# Patient Record
Sex: MALE | Race: WHITE | Employment: FULL TIME | URBAN - NONMETROPOLITAN AREA
[De-identification: names, ages, dates, MRNs, and addresses within clinical notes are randomized per-mention and may not be internally consistent; named-entity substitution may affect disease eponyms.]

---

## 2017-01-30 ENCOUNTER — OFFICE VISIT (OUTPATIENT)
Dept: UROLOGY | Age: 52
End: 2017-01-30

## 2017-01-30 VITALS
DIASTOLIC BLOOD PRESSURE: 68 MMHG | HEIGHT: 73 IN | BODY MASS INDEX: 27.17 KG/M2 | SYSTOLIC BLOOD PRESSURE: 108 MMHG | WEIGHT: 205 LBS

## 2017-01-30 DIAGNOSIS — N41.9 PROSTATITIS, UNSPECIFIED PROSTATITIS TYPE: Primary | ICD-10-CM

## 2017-01-30 LAB
BILIRUBIN URINE: NEGATIVE
BLOOD URINE, POC: NORMAL
CHARACTER, URINE: CLEAR
COLOR, URINE: YELLOW
GLUCOSE URINE: NEGATIVE MG/DL
KETONES, URINE: NORMAL
LEUKOCYTE CLUMPS, URINE: NEGATIVE
NITRITE, URINE: NEGATIVE
PH, URINE: 5.5
PROTEIN, URINE: NEGATIVE MG/DL
SPECIFIC GRAVITY, URINE: 1.02 (ref 1–1.03)
UROBILINOGEN, URINE: 0.2 EU/DL

## 2017-01-30 PROCEDURE — 81003 URINALYSIS AUTO W/O SCOPE: CPT | Performed by: UROLOGY

## 2017-01-30 PROCEDURE — 99213 OFFICE O/P EST LOW 20 MIN: CPT | Performed by: UROLOGY

## 2017-01-30 ASSESSMENT — ENCOUNTER SYMPTOMS
ABDOMINAL PAIN: 0
BACK PAIN: 1
NAUSEA: 0
EYE PAIN: 0
EYE REDNESS: 0
COLOR CHANGE: 0
FACIAL SWELLING: 0
SHORTNESS OF BREATH: 0
CHEST TIGHTNESS: 0

## 2017-09-06 RX ORDER — CIPROFLOXACIN 500 MG/1
500 TABLET, FILM COATED ORAL 2 TIMES DAILY
Qty: 20 TABLET | Refills: 0 | Status: SHIPPED | OUTPATIENT
Start: 2017-09-06 | End: 2017-09-16

## 2017-09-06 RX ORDER — TAMSULOSIN HYDROCHLORIDE 0.4 MG/1
0.4 CAPSULE ORAL NIGHTLY
Qty: 90 CAPSULE | Refills: 3 | Status: SHIPPED | OUTPATIENT
Start: 2017-09-06 | End: 2018-10-19 | Stop reason: SDUPTHER

## 2017-11-02 ENCOUNTER — OFFICE VISIT (OUTPATIENT)
Dept: UROLOGY | Age: 52
End: 2017-11-02
Payer: COMMERCIAL

## 2017-11-02 VITALS
WEIGHT: 212 LBS | HEIGHT: 72 IN | DIASTOLIC BLOOD PRESSURE: 90 MMHG | SYSTOLIC BLOOD PRESSURE: 120 MMHG | BODY MASS INDEX: 28.71 KG/M2

## 2017-11-02 DIAGNOSIS — N41.9 PROSTATITIS, UNSPECIFIED PROSTATITIS TYPE: Primary | ICD-10-CM

## 2017-11-02 DIAGNOSIS — N50.812 TESTICULAR PAIN, LEFT: ICD-10-CM

## 2017-11-02 LAB
BILIRUBIN URINE: NEGATIVE
BLOOD URINE, POC: NORMAL
CHARACTER, URINE: CLEAR
COLOR, URINE: YELLOW
GLUCOSE URINE: NEGATIVE MG/DL
KETONES, URINE: NEGATIVE
LEUKOCYTE CLUMPS, URINE: NEGATIVE
NITRITE, URINE: NEGATIVE
PH, URINE: 5.5
POST VOID RESIDUAL (PVR): NORMAL ML
PROTEIN, URINE: NEGATIVE MG/DL
SPECIFIC GRAVITY, URINE: 1.02 (ref 1–1.03)
UROBILINOGEN, URINE: 0.2 EU/DL

## 2017-11-02 PROCEDURE — 51798 US URINE CAPACITY MEASURE: CPT | Performed by: UROLOGY

## 2017-11-02 PROCEDURE — 99214 OFFICE O/P EST MOD 30 MIN: CPT | Performed by: UROLOGY

## 2017-11-02 PROCEDURE — 81003 URINALYSIS AUTO W/O SCOPE: CPT | Performed by: UROLOGY

## 2017-11-02 RX ORDER — CIPROFLOXACIN 500 MG/1
500 TABLET, FILM COATED ORAL 2 TIMES DAILY
Qty: 28 TABLET | Refills: 0 | Status: SHIPPED | OUTPATIENT
Start: 2017-11-02 | End: 2017-11-09

## 2017-11-02 ASSESSMENT — ENCOUNTER SYMPTOMS
VOMITING: 0
COLOR CHANGE: 0
CHEST TIGHTNESS: 0
EYE REDNESS: 0
ABDOMINAL PAIN: 0
EYE PAIN: 0
FACIAL SWELLING: 0
SHORTNESS OF BREATH: 0

## 2017-11-02 NOTE — PROGRESS NOTES
Subjective:      Patient ID: Erasmo Cortez 46 y.o. male 1965    Chief Complaint   Patient presents with    Follow-up     Prostatitis        Testicle Pain   This is a recurrent (left testicular pain) problem. The current episode started 1 to 4 weeks ago. The problem occurs intermittently. The problem has been waxing and waning. Pertinent negatives include no abdominal pain, chest pain, chills, fever, neck pain, rash or vomiting. Nothing aggravates the symptoms. He has tried nothing for the symptoms. History reviewed. No pertinent past medical history. Social History     Social History    Marital status:      Spouse name: N/A    Number of children: N/A    Years of education: N/A     Occupational History    Not on file. Social History Main Topics    Smoking status: Never Smoker    Smokeless tobacco: Not on file    Alcohol use Not on file    Drug use: Unknown    Sexual activity: Not on file     Other Topics Concern    Not on file     Social History Narrative    No narrative on file       No family history on file. Past Surgical History:   Procedure Laterality Date    COLONOSCOPY  2016    HAND SURGERY  1987    thumb surgery    PENIS SURGERY  2002    cyst-ejaculation duct       No Known Allergies      Current Outpatient Prescriptions:     tamsulosin (FLOMAX) 0.4 MG capsule, Take 1 capsule by mouth nightly, Disp: 90 capsule, Rfl: 3    Review of Systems   Constitutional: Negative for chills and fever. HENT: Negative for ear pain and facial swelling. Eyes: Negative for pain and redness. Respiratory: Negative for chest tightness and shortness of breath. Cardiovascular: Negative for chest pain and leg swelling. Gastrointestinal: Negative for abdominal pain and vomiting. Endocrine: Negative for cold intolerance and heat intolerance. Genitourinary: Positive for difficulty urinating, dysuria, frequency, testicular pain and urgency.    Musculoskeletal: Negative for neck pain and neck stiffness. Skin: Negative for color change and rash. Allergic/Immunologic: Negative for environmental allergies and food allergies. Neurological: Negative for dizziness and light-headedness. Hematological: Does not bruise/bleed easily. Ht 6' (1.829 m)   Wt 212 lb (96.2 kg)   BMI 28.75 kg/m²     Objective:   Physical Exam   Constitutional: He is oriented to person, place, and time. Vital signs are normal. He appears well-developed and well-nourished. He is cooperative. No distress. HENT:   Head: Normocephalic and atraumatic. Mouth/Throat: Oropharynx is clear and moist and mucous membranes are normal. No oropharyngeal exudate. Eyes: EOM are normal. Pupils are equal, round, and reactive to light. Right eye exhibits no discharge. Left eye exhibits no discharge. No scleral icterus. Neck: Trachea normal. No JVD present. No tracheal deviation present. Pulmonary/Chest: Effort normal. No respiratory distress. He has no wheezes. Abdominal: Soft. He exhibits no distension. There is no tenderness. There is no rebound and no CVA tenderness. Musculoskeletal: He exhibits no edema or tenderness. Lymphadenopathy:        Right: No supraclavicular adenopathy present. Left: No supraclavicular adenopathy present. Neurological: He is alert and oriented to person, place, and time. No cranial nerve deficit. Skin: Skin is warm and dry. He is not diaphoretic. Psychiatric: He has a normal mood and affect. His behavior is normal.   Nursing note and vitals reviewed.     Labs    Results for POC orders placed in visit on 11/02/17   POCT Urinalysis No Micro (Auto)   Result Value Ref Range    Glucose, Ur Negative NEGATIVE mg/dl    Bilirubin Urine Negative     Ketones, Urine Negative NEGATIVE    Specific Gravity, Urine 1.025 1.002 - 1.03    Blood, UA POC Trace-lysed NEGATIVE    pH, Urine 5.50 5.0 - 9.0    Protein, Urine Negative NEGATIVE mg/dl    Urobilinogen, Urine 0.20 0.0 - 1.0 eu/dl Nitrite, Urine Negative NEGATIVE    Leukocyte Clumps, Urine Negative NEGATIVE    Color, Urine Yellow YELLOW-STR    Character, Urine Clear CLR-SL.CESIA   poct post void residual   Result Value Ref Range    post void residual Bladder Scan ml       No results found for: CREATININE, BUN, NA, K, CL, CO2    No results found for: PSA      Assessment:      1. Prostatitis, unspecified prostatitis type  POCT Urinalysis No Micro (Auto)    poct post void residual       Mr. Sg Becerra presents today in follow-up for Prostatitis, unspecified prostatitis type [N41.9]. He is still having pain in the left testicle. He says he has a hydrocele there which he feels has been more inflamed recently causing pain. He was put on Cipro in the past and this improved symptoms. We will put him on another two week course of Cipro. Examination: tenderness on the left testicle. Possible epididymal cyst.    He describes the pain as a nagging feeling. He states in the past he had a cyst removed off the ejaculatory duct on the left side. We will get a scrotal ultrasound in the near future. I have reviewed all notes sent along with this referral including notes from a recent visit to his primary care provider. These records demonstrated the following past medical history:  History reviewed. No pertinent past medical history. Plan:        Schedule a scrotal ultrasound. Return in 2 weeks to go over results.    Cipro started

## 2017-11-13 ENCOUNTER — HOSPITAL ENCOUNTER (OUTPATIENT)
Dept: ULTRASOUND IMAGING | Age: 52
Discharge: HOME | End: 2017-11-13
Payer: COMMERCIAL

## 2017-11-13 DIAGNOSIS — N50.812 TESTICULAR PAIN, LEFT: ICD-10-CM

## 2017-11-13 PROCEDURE — 76870 US EXAM SCROTUM: CPT

## 2017-11-21 ENCOUNTER — OFFICE VISIT (OUTPATIENT)
Dept: UROLOGY | Age: 52
End: 2017-11-21
Payer: COMMERCIAL

## 2017-11-21 VITALS
DIASTOLIC BLOOD PRESSURE: 63 MMHG | SYSTOLIC BLOOD PRESSURE: 134 MMHG | BODY MASS INDEX: 27.36 KG/M2 | HEIGHT: 72 IN | WEIGHT: 202 LBS

## 2017-11-21 DIAGNOSIS — N41.9 PROSTATITIS, UNSPECIFIED PROSTATITIS TYPE: Primary | ICD-10-CM

## 2017-11-21 LAB
BILIRUBIN URINE: NORMAL
BLOOD URINE, POC: NORMAL
CHARACTER, URINE: CLEAR
COLOR, URINE: YELLOW
GLUCOSE URINE: NEGATIVE MG/DL
KETONES, URINE: NORMAL
LEUKOCYTE CLUMPS, URINE: NEGATIVE
NITRITE, URINE: NEGATIVE
PH, URINE: 5
PROTEIN, URINE: NEGATIVE MG/DL
SPECIFIC GRAVITY, URINE: >= 1.03 (ref 1–1.03)
UROBILINOGEN, URINE: 0.2 EU/DL

## 2017-11-21 PROCEDURE — 81003 URINALYSIS AUTO W/O SCOPE: CPT | Performed by: NURSE PRACTITIONER

## 2017-11-21 PROCEDURE — 99213 OFFICE O/P EST LOW 20 MIN: CPT | Performed by: NURSE PRACTITIONER

## 2017-11-21 RX ORDER — KETOROLAC TROMETHAMINE 10 MG/1
10 TABLET, FILM COATED ORAL EVERY 6 HOURS PRN
Qty: 30 TABLET | Refills: 1 | Status: SHIPPED | OUTPATIENT
Start: 2017-11-21 | End: 2018-08-03

## 2017-11-21 NOTE — PROGRESS NOTES
[facility]  [facility]  [address]  [address]  [place] [address]  Dept: [phone]  Loc: [phone]  Visit Date: 11/21/2017      HPI:     Jazlyn Erickson follows up today with scrotal US. He was last evaluated by Dr. Jackie Fenton on 11/2/17. He is complaining of left testicular pain, and possible prostatitis. He was placed on another 2 week course of Cipro, symptoms are still present. No real improvement. Scrotal ultrasound was performed and showed tiny hydrocele left side, small epididymal head cysts bilaterally. Tiny 2 mm cyst in the left testicle. He does report some issues with emptying his bladder all the way. He has never taken any medications for this. He comes in today by himself. History is obtained from the patient and the medical record. Current Outpatient Prescriptions   Medication Sig Dispense Refill    ketorolac (TORADOL) 10 MG tablet Take 1 tablet by mouth every 6 hours as needed for Pain 30 tablet 1    tamsulosin (FLOMAX) 0.4 MG capsule Take 1 capsule by mouth nightly 90 capsule 3     No current facility-administered medications for this visit. Past Medical History  Rl Goetz  has no past medical history on file. Past Surgical History  The patient  has a past surgical history that includes Hand surgery (1987); Penis surgery (2002); and Colonoscopy (2016). Family History  This patient's family history is not on file. Social History  Rl Goetz  reports that he has never smoked. He has never used smokeless tobacco.    Subjective:     Review of Systems  No problems with ears, nose or throat. No problems with eyes. No chest pain, shortness of breath, abdominal pain, extremity pain or weakness, and no neurological deficits. No rashes.  symptoms per HPI. The remainder of the review of symptoms is negative.     Objective:     PE:   Vitals:    11/21/17 1531   BP: 134/63   Weight: 202 lb (91.6 kg)   Height: 6' (1.829 m)       Constitutional: Alert and oriented times 3, no acute distress and cooperative to examination with appropriate mood and affect. HENT:   Head:        Normocephalic and atraumatic. Mouth/Throat:         Mucous membranes are normal.   Eyes:         EOM are normal. No scleral icterus. PERRLA. Neck:        Supple, symmetrical, trachea midline  Pulmonary/Chest:      Chest symmetric with normal A/P diameter. Normal respiratory rate and rhthym. No use of accessory muscles. Abdominal:         Soft. No tenderness. Bowel sounds present. Musculoskeletal:         Normal range of motion. No edema or tenderness of lower extremities. Extremities: No cyanosis, clubbing, or edema present. Neurological:        Alert and oriented. No cranial nerve deficit. Gevena Tammy Psychiatric:        Normal mood and affect. Labs   Urine dip demonstrates   Results for POC orders placed in visit on 11/21/17   POCT Urinalysis No Micro (Auto)   Result Value Ref Range    Glucose, Ur Negative NEGATIVE mg/dl    Bilirubin Urine Small     Ketones, Urine Trace NEGATIVE    Specific Gravity, Urine >= 1.030 1.002 - 1.03    Blood, UA POC Small NEGATIVE    pH, Urine 5.00 5.0 - 9.0    Protein, Urine Negative NEGATIVE mg/dl    Urobilinogen, Urine 0.20 0.0 - 1.0 eu/dl    Nitrite, Urine Negative NEGATIVE    Leukocyte Clumps, Urine Negative NEGATIVE    Color, Urine Yellow YELLOW-STR    Character, Urine Clear CLR-SL.CESIA       Patients recent PSA values are as follows  No results found for: PSA, PSADIA     Recent BUN/Creatinine:  No results found for: BUN, CREATININE    Radiology  The patient has had a Scrotal Ultrasound which I have reviewed along with its accompanying report. The study demonstrates   FINDINGS:        Right Testicle- 5.8 x 3.2 x 2.8 cm   Right Epididymis- 0.9 x 1.3 x 0.9 cm   Left Testicle - 5.6 x 3.3 x 3.2 cm    Left Epididymis - 0.8 x 1.7 x 1 cm       Both testicles are normal in size, contour, and echogenicity.  Normal vascular color flow demonstrated to both

## 2018-01-09 RX ORDER — TAMSULOSIN HYDROCHLORIDE 0.4 MG/1
0.4 CAPSULE ORAL NIGHTLY
Qty: 90 CAPSULE | Refills: 3 | OUTPATIENT
Start: 2018-01-09 | End: 2019-01-09

## 2018-02-09 NOTE — TELEPHONE ENCOUNTER
Valiant Goodell called requesting a refill on the following medications:  Requested Prescriptions     Pending Prescriptions Disp Refills    ciprofloxacin (CIPRO) 500 MG tablet 28 tablet 0     Sig: Take 1 tablet by mouth 2 times daily for 14 doses     Pharmacy verified:  [facility]      Date of last visit: 11/21/17  Date of next visit (if applicable): Visit date not found

## 2018-02-12 ENCOUNTER — TELEPHONE (OUTPATIENT)
Dept: UROLOGY | Age: 53
End: 2018-02-12

## 2018-02-12 RX ORDER — CIPROFLOXACIN 500 MG/1
500 TABLET, FILM COATED ORAL 2 TIMES DAILY
Qty: 20 TABLET | Refills: 0 | Status: SHIPPED | OUTPATIENT
Start: 2018-02-12 | End: 2018-02-22

## 2018-02-12 RX ORDER — CIPROFLOXACIN 500 MG/1
500 TABLET, FILM COATED ORAL 2 TIMES DAILY
Qty: 28 TABLET | Refills: 0 | OUTPATIENT
Start: 2018-02-12 | End: 2018-02-19

## 2018-02-12 NOTE — TELEPHONE ENCOUNTER
Is he experiencing any urinary symptoms, frequency/urgency/dysuria/incomplete bladder emptying??    If he is, he may benefit from obtaining a urine sample prior to starting antibiotics to evaluate for infection, regardless, he needs to be seen in follow up as I see he has no appt scheduled.  Thanks    Cipro sent to pharmacy     [employer]

## 2018-02-12 NOTE — TELEPHONE ENCOUNTER
Patient is have pain when urinating, his prostatitis is acting up, he feels he might have an infection, that is why he is asking for Cipro.  Please advise at [phone]

## 2018-02-12 NOTE — TELEPHONE ENCOUNTER
Left message, per HIPAA, asking for a phone call back to our office to further evaluate patient symptoms.

## 2018-02-12 NOTE — TELEPHONE ENCOUNTER
LM for pt to call the office to ask if he is currently having symptoms, we need to find out why he needs the refill on the cipro.

## 2018-08-03 ENCOUNTER — OFFICE VISIT (OUTPATIENT)
Dept: UROLOGY | Age: 53
End: 2018-08-03
Payer: COMMERCIAL

## 2018-08-03 VITALS
HEIGHT: 72 IN | BODY MASS INDEX: 28.44 KG/M2 | SYSTOLIC BLOOD PRESSURE: 102 MMHG | DIASTOLIC BLOOD PRESSURE: 70 MMHG | WEIGHT: 210 LBS

## 2018-08-03 DIAGNOSIS — G89.29 CHRONIC MALE PELVIC PAIN: Primary | ICD-10-CM

## 2018-08-03 DIAGNOSIS — R39.12 WEAK URINARY STREAM: ICD-10-CM

## 2018-08-03 DIAGNOSIS — R10.2 CHRONIC MALE PELVIC PAIN: Primary | ICD-10-CM

## 2018-08-03 DIAGNOSIS — R39.15 URINARY URGENCY: ICD-10-CM

## 2018-08-03 DIAGNOSIS — R31.29 MICROHEMATURIA: ICD-10-CM

## 2018-08-03 LAB
BILIRUBIN URINE: NEGATIVE
BLOOD URINE, POC: NORMAL
CHARACTER, URINE: CLEAR
COLOR, URINE: YELLOW
GLUCOSE URINE: NEGATIVE MG/DL
KETONES, URINE: NEGATIVE
LEUKOCYTE CLUMPS, URINE: NEGATIVE
NITRITE, URINE: NEGATIVE
PH, URINE: 5.5
POST VOID RESIDUAL (PVR): 5 ML
PROSTATE SPECIFIC ANTIGEN: 1.6 NG/ML
PROTEIN, URINE: NEGATIVE MG/DL
SPECIFIC GRAVITY, URINE: 1.01 (ref 1–1.03)
UROBILINOGEN, URINE: 0.2 EU/DL

## 2018-08-03 PROCEDURE — 81003 URINALYSIS AUTO W/O SCOPE: CPT | Performed by: NURSE PRACTITIONER

## 2018-08-03 PROCEDURE — 99213 OFFICE O/P EST LOW 20 MIN: CPT | Performed by: NURSE PRACTITIONER

## 2018-08-03 PROCEDURE — 51798 US URINE CAPACITY MEASURE: CPT | Performed by: NURSE PRACTITIONER

## 2018-08-03 RX ORDER — AMITRIPTYLINE HYDROCHLORIDE 10 MG/1
10 TABLET, FILM COATED ORAL NIGHTLY
Qty: 30 TABLET | Refills: 3 | Status: SHIPPED | OUTPATIENT
Start: 2018-08-03 | End: 2019-08-09

## 2018-08-03 NOTE — PROGRESS NOTES
[address] [place] [address] Braden Maciel  Dept: [phone]  Loc: [phone]  Visit Date: 8/3/2018      HPI:     Brendolyn Home f/u today with left testicular pain. He reports pain is always present, will wax and wane, can be severe. Scrotal ultrasound in the past showed tiny hydrocele left side, small epididymal head cysts bilaterally. Tiny 2 mm cyst in the left testicle. He reports no real improvement with antibiotics. He continues on Flomax. Tolerating well. Denies any side effects  Urinalysis shows trace amount of blood which it has on previous urinalysis   He comes in today by himself. History is obtained from the patient and the medical record. Current Outpatient Prescriptions   Medication Sig Dispense Refill    Multiple Vitamin (MULTI-VITAMIN DAILY PO) Take by mouth      amitriptyline (ELAVIL) 10 MG tablet Take 1 tablet by mouth nightly 30 tablet 3    tamsulosin (FLOMAX) 0.4 MG capsule Take 1 capsule by mouth nightly 90 capsule 3     No current facility-administered medications for this visit. Past Medical History  Lulu Quigley  has no past medical history on file. Past Surgical History  The patient  has a past surgical history that includes Hand surgery (1987); Penis surgery (2002); and Colonoscopy (2016). Family History  This patient's family history is not on file. Social History  Lulu Quigley  reports that he has never smoked. He has never used smokeless tobacco.    Subjective:     Review of Systems  No problems with ears, nose or throat. No problems with eyes. No chest pain, shortness of breath, abdominal pain, extremity pain or weakness, and no neurological deficits. No rashes.  symptoms per HPI. The remainder of the review of symptoms is negative.     Objective:     PE:   Vitals:    08/03/18 1152   BP: 102/70   Weight: 210 lb (95.3 kg)   Height: 6' (1.829 m)       Constitutional: Alert and oriented times

## 2018-10-19 RX ORDER — TAMSULOSIN HYDROCHLORIDE 0.4 MG/1
0.4 CAPSULE ORAL NIGHTLY
Qty: 90 CAPSULE | Refills: 3 | Status: SHIPPED | OUTPATIENT
Start: 2018-10-19 | End: 2020-01-02 | Stop reason: SDUPTHER

## 2018-10-19 NOTE — TELEPHONE ENCOUNTER
Refill Request  tamsulosin    Medication:tamsulosin 0.4 mg    Last Refill 09/06/2017  #90 3 refills  Provider Last Seen Coy Holbrook  Date of Last Visit 08/03/2018  Date of Follow Up 05/10/2019  Pharmacy [phone]

## 2019-08-09 ENCOUNTER — OFFICE VISIT (OUTPATIENT)
Dept: UROLOGY | Age: 54
End: 2019-08-09
Payer: COMMERCIAL

## 2019-08-09 VITALS
SYSTOLIC BLOOD PRESSURE: 112 MMHG | BODY MASS INDEX: 27.92 KG/M2 | HEIGHT: 72 IN | DIASTOLIC BLOOD PRESSURE: 70 MMHG | WEIGHT: 206.1 LBS

## 2019-08-09 DIAGNOSIS — N41.9 PROSTATITIS, UNSPECIFIED PROSTATITIS TYPE: Primary | ICD-10-CM

## 2019-08-09 LAB
BILIRUBIN URINE: NEGATIVE
BLOOD URINE, POC: NORMAL
CHARACTER, URINE: CLEAR
COLOR, URINE: YELLOW
GLUCOSE URINE: NEGATIVE MG/DL
KETONES, URINE: NEGATIVE
LEUKOCYTE CLUMPS, URINE: NEGATIVE
NITRITE, URINE: NEGATIVE
PH, URINE: 5 (ref 5–9)
PROTEIN, URINE: NEGATIVE MG/DL
SPECIFIC GRAVITY, URINE: 1.02 (ref 1–1.03)
UROBILINOGEN, URINE: 0.2 EU/DL (ref 0–1)

## 2019-08-09 PROCEDURE — 99213 OFFICE O/P EST LOW 20 MIN: CPT | Performed by: NURSE PRACTITIONER

## 2019-08-09 PROCEDURE — 81003 URINALYSIS AUTO W/O SCOPE: CPT | Performed by: NURSE PRACTITIONER

## 2019-08-09 RX ORDER — CIPROFLOXACIN 500 MG/1
500 TABLET, FILM COATED ORAL 2 TIMES DAILY
Qty: 42 TABLET | Refills: 0 | Status: SHIPPED | OUTPATIENT
Start: 2019-08-09 | End: 2019-08-30

## 2019-08-09 NOTE — PROGRESS NOTES
Weight: 206 lb 1.6 oz (93.5 kg)   Height: 6' (1.829 m)       Constitutional: Alert and oriented times 3, no acute distress and cooperative to examination with appropriate mood and affect. HENT:   Head:        Normocephalic and atraumatic. Mouth/Throat:         Mucous membranes are normal.   Eyes:         EOM are normal. No scleral icterus. PERRLA. Neck:        Supple, symmetrical, trachea midline  Pulmonary/Chest:      Chest symmetric with normal A/P diameter. Normal respiratory rate and rhthym. No use of accessory muscles. Abdominal:         Soft. No tenderness. Bowel sounds present. Musculoskeletal:         Normal range of motion. No edema or tenderness of lower extremities. Extremities: No cyanosis, clubbing, or edema present. Neurological:        Alert and oriented. No cranial nerve deficit. John Saunas Psychiatric:        Normal mood and affect. Labs   Urine dip demonstrates   Results for POC orders placed in visit on 08/09/19   POCT Urinalysis No Micro (Auto)   Result Value Ref Range    Glucose, Ur Negative NEGATIVE mg/dl    Bilirubin Urine Negative     Ketones, Urine Negative NEGATIVE    Specific Gravity, Urine 1.025 1.002 - 1.03    Blood, UA POC Trace-lysed NEGATIVE    pH, Urine 5.00 5.0 - 9.0    Protein, Urine Negative NEGATIVE mg/dl    Urobilinogen, Urine 0.20 0.0 - 1.0 eu/dl    Nitrite, Urine Negative NEGATIVE    Leukocyte Clumps, Urine Negative NEGATIVE    Color, Urine Yellow YELLOW-STR    Character, Urine Clear CLR-SL.CESIA       Patients recent PSA values are as follows  Lab Results   Component Value Date    PSA 1.60 08/03/2018        Recent BUN/Creatinine:  No results found for: BUN, CREATININE    Radiology  The patient has had a Scrotal Ultrasound which I have reviewed along with its accompanying report.   The study demonstrates   FINDINGS:        Right Testicle- 5.8 x 3.2 x 2.8 cm   Right Epididymis- 0.9 x 1.3 x 0.9 cm   Left Testicle - 5.6 x 3.3 x 3.2 cm    Left Epididymis - 0.8 x 1.7 x

## 2019-12-31 LAB
A/G RATIO: 1.1
ALBUMIN SERPL-MCNC: 3.7 G/DL
ALP BLD-CCNC: 97 U/L
ALT SERPL-CCNC: 45 U/L
AST SERPL-CCNC: 18 U/L
BASOPHILS ABSOLUTE: 0.06 /ΜL
BASOPHILS RELATIVE PERCENT: 0.9 %
BILIRUB SERPL-MCNC: 1.3 MG/DL (ref 0.1–1.4)
BILIRUBIN DIRECT: NORMAL
BILIRUBIN, INDIRECT: NORMAL
BUN BLDV-MCNC: 15 MG/DL
CALCIUM SERPL-MCNC: 8.9 MG/DL
CHLORIDE BLD-SCNC: 105 MMOL/L
CO2: 32 MMOL/L
CREAT SERPL-MCNC: 1.2 MG/DL
EOSINOPHILS ABSOLUTE: 0.09 /ΜL
EOSINOPHILS RELATIVE PERCENT: 1.3 %
GFR CALCULATED: 67
GLOBULIN: 3.4
GLUCOSE BLD-MCNC: 75 MG/DL
HCT VFR BLD CALC: 44.7 % (ref 41–53)
HEMOGLOBIN: 15.6 G/DL (ref 13.5–17.5)
LYMPHOCYTES ABSOLUTE: 2.55 /ΜL
LYMPHOCYTES RELATIVE PERCENT: 37.6 %
MCH RBC QN AUTO: 30 PG
MCHC RBC AUTO-ENTMCNC: 34.9 G/DL
MCV RBC AUTO: 86 FL
MONOCYTES ABSOLUTE: 0.85 /ΜL
MONOCYTES RELATIVE PERCENT: 12.5 %
NEUTROPHILS ABSOLUTE: 3.19 /ΜL
NEUTROPHILS RELATIVE PERCENT: 47 %
PDW BLD-RTO: NORMAL %
PLATELET # BLD: 238 K/ΜL
PMV BLD AUTO: 10.2 FL
POTASSIUM SERPL-SCNC: 4 MMOL/L
PROSTATE SPECIFIC ANTIGEN: 2.55 NG/ML
PROTEIN TOTAL: 7.1 G/DL
RBC # BLD: 5.2 10^6/ΜL
SODIUM BLD-SCNC: 141 MMOL/L
WBC # BLD: 6.79 10^3/ML

## 2020-01-02 ENCOUNTER — OFFICE VISIT (OUTPATIENT)
Dept: UROLOGY | Age: 55
End: 2020-01-02
Payer: COMMERCIAL

## 2020-01-02 ENCOUNTER — TELEPHONE (OUTPATIENT)
Dept: UROLOGY | Age: 55
End: 2020-01-02

## 2020-01-02 VITALS
BODY MASS INDEX: 29.01 KG/M2 | HEIGHT: 72 IN | SYSTOLIC BLOOD PRESSURE: 118 MMHG | WEIGHT: 214.2 LBS | DIASTOLIC BLOOD PRESSURE: 74 MMHG

## 2020-01-02 PROBLEM — R10.2 CHRONIC PELVIC PAIN IN MALE: Status: ACTIVE | Noted: 2020-01-02

## 2020-01-02 PROBLEM — R31.21 ASYMPTOMATIC MICROSCOPIC HEMATURIA: Status: ACTIVE | Noted: 2020-01-02

## 2020-01-02 PROBLEM — G89.29 CHRONIC PELVIC PAIN IN MALE: Status: ACTIVE | Noted: 2020-01-02

## 2020-01-02 LAB
BILIRUBIN URINE: NEGATIVE
BLOOD URINE, POC: ABNORMAL
CHARACTER, URINE: CLEAR
COLOR, URINE: YELLOW
GLUCOSE URINE: NEGATIVE MG/DL
KETONES, URINE: NEGATIVE
LEUKOCYTE CLUMPS, URINE: NEGATIVE
NITRITE, URINE: NEGATIVE
PH, URINE: 5.5 (ref 5–9)
PROTEIN, URINE: NEGATIVE MG/DL
SPECIFIC GRAVITY, URINE: >= 1.03 (ref 1–1.03)
UROBILINOGEN, URINE: 0.2 EU/DL (ref 0–1)

## 2020-01-02 PROCEDURE — 81003 URINALYSIS AUTO W/O SCOPE: CPT | Performed by: NURSE PRACTITIONER

## 2020-01-02 PROCEDURE — 99214 OFFICE O/P EST MOD 30 MIN: CPT | Performed by: NURSE PRACTITIONER

## 2020-01-02 RX ORDER — TAMSULOSIN HYDROCHLORIDE 0.4 MG/1
0.4 CAPSULE ORAL NIGHTLY
Qty: 90 CAPSULE | Refills: 3 | Status: SHIPPED | OUTPATIENT
Start: 2020-01-02 | End: 2021-01-01

## 2020-01-02 RX ORDER — PANTOPRAZOLE SODIUM 40 MG/1
40 TABLET, DELAYED RELEASE ORAL DAILY
COMMUNITY

## 2020-01-02 NOTE — PROGRESS NOTES
[address] [place] [id number]  Dept: [phone]  Loc: [phone]  Visit Date: 1/2/2020      HPI:     Anusha Bee is a 47 y. o. with past medical history as listed below who presents today in follow-up for BPH and chronic prostatitis. This is a reoccurring problem that he has experienced multiple times over the past few years. He has been treated with multiple extended courses of antibiotics. He has been also prescribed Elavil for male pelvic pain which has helped in the past.  He states he recently saw his PCP who prescribed him Cipro for 7 days. Denies any perineal pain, fever, or chills. He continues to take Flomax nightly which helps with his urination. He states without it he has a weak stream.  He has been on Flomax for greater than a year. Alyce Agustin is also here today with complaints of left flank pain with radiation into his left lower abdomen. He states this started about 2 weeks ago. Problem is constant. He describes it as a dull ache. He states he does have some pain with urination occasionally. He states it is worse in the morning when he gets up but after moving around improves. He has tried no other treatment. Alyce Agustin comes in today by himself. Hx is obtained from the patient and medical record. Current Outpatient Medications   Medication Sig Dispense Refill    pantoprazole (PROTONIX) 40 MG tablet Take 40 mg by mouth daily      tamsulosin (FLOMAX) 0.4 MG capsule Take 1 capsule by mouth nightly 90 capsule 3    Multiple Vitamin (MULTI-VITAMIN DAILY PO) Take by mouth       No current facility-administered medications for this visit. Past Medical History  Alyce Agustin  has no past medical history on file. Past Surgical History  The patient  has a past surgical history that includes Hand surgery (1987); Penis surgery (2002);  Colonoscopy (2016); and Upper gastrointestinal endoscopy (06/2019). Family History  This patient's family history is not on file. Social History  Jessy Lagunas  reports that he has never smoked. He has never used smokeless tobacco.    Subjective:     Review of Systems  No problems with ears, nose or throat. No problems with eyes. No chest pain, shortness of breath, abdominal pain, extremity pain or weakness, and no neurological deficits. No rashes.  symptoms per HPI. The remainder of the review of symptoms is negative. Objective:     PE:   Vitals:    01/02/20 1412   BP: 118/74   Weight: 214 lb 3.2 oz (97.2 kg)   Height: 6' (1.829 m)       Constitutional: Alert and oriented times 3, no acute distress and cooperative to examination with appropriate mood and affect. HENT:   Head:        Normocephalic and atraumatic. Mouth/Throat:         Mucous membranes are normal.   Eyes:         EOM are normal. No scleral icterus. PERRLA. Neck:        Supple, symmetrical, trachea midline  Cardiovascular:        Normal rate, regular rhythm, S1 S2 heart sounds. No murmurs, rub, or gallops. Pulses:       Radial pulses are 2+/4 bilateral and equal. Posterior tibialis 2+/4 bilateral and equal  Pulmonary/Chest:      Chest symmetric with normal A/P diameter,  CTA with no wheezes, rales, or rhonchi noted. Normal respiratory rate and rhthym. No use of accessory muscles. Abdominal:         Soft. No tenderness. Bowel sounds present. Musculoskeletal:         Normal range of motion. No edema or tenderness of lower extremities. Extremities: No cyanosis, clubbing, or edema present. Neurological:        Alert and oriented. No cranial nerve deficit. Darlean Cork Psychiatric:        Normal mood and affect.       Labs   Urine dip demonstrates   Results for POC orders placed in visit on 01/02/20   POCT Urinalysis No Micro (Auto)   Result Value Ref Range    Glucose, Ur Negative NEGATIVE mg/dl    Bilirubin Urine Negative     Ketones, Urine Negative NEGATIVE    Specific Gravity, Urine >= 1.030 1.002 - 1.03    Blood, UA POC Small (A) NEGATIVE    pH, Urine 5.50 5.0 - 9.0    Protein, Urine Negative NEGATIVE mg/dl    Urobilinogen, Urine 0.20 0.0 - 1.0 eu/dl    Nitrite, Urine Negative NEGATIVE    Leukocyte Clumps, Urine Negative NEGATIVE    Color, Urine Yellow YELLOW-STR    Character, Urine Clear CLR-SL.CESIA       Patients recent PSA values are as follows  Lab Results   Component Value Date    PSA 1.60 08/03/2018       Radiology  No recent imaging for review. Assessment & Plan:     Emely Fall was seen today for follow-up. Diagnoses and all orders for this visit:    Chronic prostatitis  -     POCT Urinalysis No Micro (Auto)  -     Miscellaneous Sendout 1    Benign prostatic hyperplasia with lower urinary tract symptoms, symptom details unspecified  -     tamsulosin (FLOMAX) 0.4 MG capsule; Take 1 capsule by mouth nightly    Chronic pelvic pain in male    Left flank pain  -     CT ABDOMEN PELVIS W WO CONTRAST Additional Contrast? None; Future    Asymptomatic microscopic hematuria    Send urine for guidance molecular testing today, urine dipstick negative for infection today and shows small blood  Continue Flomax daily    Previous urine cytology negative for high-grade carcinoma. We will obtain CT abdomen pelvis with and without contrast to work-up left flank pain and microscopic hematuria. I advised against taking Cipro at this time. His PSA is 2.5 but must consider recent YANIV at PCP office that could skew results. We will plan to recheck his PSA in 1 year. I spent 35 minutes with patient today. >50% time spent counseling and/or coordination care as outlined above. Return in about 1 year (around 1/2/2021) for BPH.     GIO Mejia  Urology

## 2020-01-06 NOTE — TELEPHONE ENCOUNTER
Patient is scheduled for his CT scan abd/pelvis w/wo IV contrast on 01- at 8:20am with an arrival of 7:50am at [facility].[address]. Patient advised to have nothing to eat or drink 4-hours prior.

## 2020-01-15 ENCOUNTER — HOSPITAL ENCOUNTER (OUTPATIENT)
Dept: CT IMAGING | Age: 55
Discharge: HOME | End: 2020-01-15
Payer: COMMERCIAL

## 2020-01-15 PROCEDURE — 74178 CT ABD&PLV WO CNTR FLWD CNTR: CPT

## 2020-01-15 PROCEDURE — 6360000004 HC RX CONTRAST MEDICATION: Performed by: NURSE PRACTITIONER

## 2020-01-15 RX ADMIN — IOPAMIDOL 80 ML: 755 INJECTION, SOLUTION INTRAVENOUS at 07:38
